# Patient Record
Sex: MALE | Race: BLACK OR AFRICAN AMERICAN | NOT HISPANIC OR LATINO | URBAN - METROPOLITAN AREA
[De-identification: names, ages, dates, MRNs, and addresses within clinical notes are randomized per-mention and may not be internally consistent; named-entity substitution may affect disease eponyms.]

---

## 2018-08-28 ENCOUNTER — OFFICE VISIT CONVERTED (OUTPATIENT)
Dept: SURGERY | Facility: CLINIC | Age: 68
End: 2018-08-28
Attending: NURSE PRACTITIONER

## 2019-07-24 ENCOUNTER — HOSPITAL ENCOUNTER (OUTPATIENT)
Dept: SURGERY | Facility: HOSPITAL | Age: 69
Setting detail: HOSPITAL OUTPATIENT SURGERY
Discharge: HOME | End: 2019-07-24
Attending: SURGERY

## 2021-05-16 VITALS — BODY MASS INDEX: 22.08 KG/M2 | WEIGHT: 163 LBS | HEIGHT: 72 IN | RESPIRATION RATE: 12 BRPM

## 2023-08-02 ENCOUNTER — OFFICE VISIT (OUTPATIENT)
Dept: FAMILY MEDICINE CLINIC | Facility: CLINIC | Age: 73
End: 2023-08-02
Payer: MEDICARE

## 2023-08-02 VITALS
WEIGHT: 162.4 LBS | TEMPERATURE: 98.7 F | RESPIRATION RATE: 16 BRPM | DIASTOLIC BLOOD PRESSURE: 78 MMHG | BODY MASS INDEX: 22 KG/M2 | OXYGEN SATURATION: 95 % | HEIGHT: 72 IN | HEART RATE: 75 BPM | SYSTOLIC BLOOD PRESSURE: 138 MMHG

## 2023-08-02 DIAGNOSIS — B07.0 PLANTAR WART OF LEFT FOOT: ICD-10-CM

## 2023-08-02 DIAGNOSIS — M79.672 LEFT FOOT PAIN: ICD-10-CM

## 2023-08-02 DIAGNOSIS — Z76.89 ENCOUNTER TO ESTABLISH CARE: Primary | ICD-10-CM

## 2023-08-25 ENCOUNTER — OFFICE VISIT (OUTPATIENT)
Dept: FAMILY MEDICINE CLINIC | Facility: CLINIC | Age: 73
End: 2023-08-25
Payer: MEDICARE

## 2023-08-25 VITALS
TEMPERATURE: 97.6 F | HEART RATE: 80 BPM | SYSTOLIC BLOOD PRESSURE: 148 MMHG | WEIGHT: 160 LBS | BODY MASS INDEX: 21.67 KG/M2 | OXYGEN SATURATION: 96 % | RESPIRATION RATE: 18 BRPM | HEIGHT: 72 IN | DIASTOLIC BLOOD PRESSURE: 82 MMHG

## 2023-08-25 DIAGNOSIS — Z87.891 PERSONAL HISTORY OF TOBACCO USE, PRESENTING HAZARDS TO HEALTH: ICD-10-CM

## 2023-08-25 DIAGNOSIS — B07.0 PLANTAR WART OF LEFT FOOT: Primary | ICD-10-CM

## 2023-08-25 DIAGNOSIS — Z11.59 NEED FOR HEPATITIS C SCREENING TEST: ICD-10-CM

## 2023-08-25 DIAGNOSIS — Z12.11 ENCOUNTER FOR SCREENING FOR MALIGNANT NEOPLASM OF COLON: ICD-10-CM

## 2023-08-25 DIAGNOSIS — Z13.29 SCREENING FOR THYROID DISORDER: ICD-10-CM

## 2023-08-25 DIAGNOSIS — Z12.2 ENCOUNTER FOR SCREENING FOR LUNG CANCER: ICD-10-CM

## 2023-08-25 DIAGNOSIS — Z13.220 SCREENING, LIPID: ICD-10-CM

## 2023-08-25 DIAGNOSIS — Z87.891 PERSONAL HISTORY OF NICOTINE DEPENDENCE: ICD-10-CM

## 2023-08-25 PROCEDURE — 1160F RVW MEDS BY RX/DR IN RCRD: CPT

## 2023-08-25 PROCEDURE — 99214 OFFICE O/P EST MOD 30 MIN: CPT

## 2023-08-25 PROCEDURE — 1159F MED LIST DOCD IN RCRD: CPT

## 2023-08-25 NOTE — PROGRESS NOTES
"Chief Complaint   Patient presents with    Foot Pain     Wart on left foot.       Subjective          José Miguel Story presents to [facility]    History of Present Illness  José Miguel is here to be seen for foot pain on the left foot. He has a wart on that side that has been cut out twice by Dr. Simmons and froze by Dr Zieglre once. He would like to proceed with seeing Podiatry for this. He is in a lot of pain and is unable to ambulate properly.     Past History:  Medical History: has no past medical history on file.   Surgical History: has a past surgical history that includes Wart Removal.   Family History: family history is not on file.   Social History: reports that he has been smoking cigarettes. He started smoking about 45 years ago. He has a 20.25 pack-year smoking history. He has never been exposed to tobacco smoke. He has never used smokeless tobacco. He reports current alcohol use. He reports that he does not use drugs.  Allergies: Patient has no known allergies.  (Not in a hospital admission)       Social History     Socioeconomic History    Marital status:    Tobacco Use    Smoking status: Every Day     Packs/day: 0.45     Years: 45.00     Pack years: 20.25     Types: Cigarettes     Start date: 1978     Passive exposure: Never    Smokeless tobacco: Never   Vaping Use    Vaping Use: Never used   Substance and Sexual Activity    Alcohol use: Yes     Comment: rarely    Drug use: Never    Sexual activity: Defer       Health Maintenance Due   Topic Date Due    COLORECTAL CANCER SCREENING  Never done    LUNG CANCER SCREENING  Never done    HEPATITIS C SCREENING  Never done    AAA SCREEN (ONE-TIME)  Never done       Objective     Vital Signs:   /82 (BP Location: Left arm, Patient Position: Sitting, Cuff Size: Adult)   Pulse 80   Temp 97.6 øF (36.4 øC) (Temporal)   Resp 18   Ht 182.9 cm (72\")   Wt 72.6 kg (160 lb)   SpO2 96%   BMI 21.70 kg/mý       Physical " Exam  Constitutional:       Appearance: Normal appearance.   HENT:      Nose: Nose normal.      Mouth/Throat:      Mouth: Mucous membranes are moist.   Cardiovascular:      Rate and Rhythm: Normal rate and regular rhythm.      Pulses: Normal pulses.      Heart sounds: Normal heart sounds.   Pulmonary:      Effort: Pulmonary effort is normal.      Breath sounds: Normal breath sounds.   Skin:     General: Skin is warm and dry.      Findings: Lesion present.   Neurological:      General: No focal deficit present.      Mental Status: He is alert and oriented to person, place, and time.   Psychiatric:         Mood and Affect: Mood normal.         Behavior: Behavior normal.        Review of Systems   All other systems reviewed and are negative.     Result Review :                 Assessment and Plan    Diagnoses and all orders for this visit:    1. Plantar wart of left foot (Primary)  Comments:  In a lot of pain and causing him ambulatory problems  Orders:  -     Ambulatory Referral to Podiatry    2. Encounter for screening for malignant neoplasm of colon  -     Ambulatory Referral For Screening Colonoscopy    3. Encounter for screening for lung cancer  -      CT Chest Low Dose Cancer Screening WO; Future    4. Need for hepatitis C screening test  -     Hepatitis C Antibody; Future    5. Personal history of tobacco use, presenting hazards to health  -      aaa screen limited; Future    6. Personal history of nicotine dependence  -      CT Chest Low Dose Cancer Screening WO; Future  -     CBC w AUTO Differential; Future  -     Comprehensive metabolic panel; Future    7. Screening, lipid  -     Lipid panel; Future    8. Screening for thyroid disorder  -     TSH+Free T4; Future          Pt thought to be clinically stable at this time.    Follow Up   Return if symptoms worsen or fail to improve.  Patient was given instructions and counseling regarding his condition or for health maintenance advice. Please see specific  information pulled into the AVS if appropriate.

## 2023-08-30 ENCOUNTER — OFFICE VISIT (OUTPATIENT)
Dept: PODIATRY | Facility: CLINIC | Age: 73
End: 2023-08-30
Payer: MEDICARE

## 2023-08-30 VITALS
OXYGEN SATURATION: 95 % | WEIGHT: 154 LBS | HEIGHT: 72 IN | SYSTOLIC BLOOD PRESSURE: 155 MMHG | DIASTOLIC BLOOD PRESSURE: 99 MMHG | TEMPERATURE: 98 F | HEART RATE: 86 BPM | BODY MASS INDEX: 20.86 KG/M2

## 2023-08-30 DIAGNOSIS — M79.672 FOOT PAIN, LEFT: ICD-10-CM

## 2023-08-30 DIAGNOSIS — L89.891 PRESSURE INJURY OF LEFT FOOT, STAGE 1: Primary | ICD-10-CM

## 2023-08-30 RX ORDER — AMMONIUM LACTATE 12 G/100G
LOTION TOPICAL 2 TIMES DAILY
Qty: 225 G | Refills: 11 | Status: SHIPPED | OUTPATIENT
Start: 2023-08-30

## 2023-08-30 NOTE — LETTER
August 30, 2023       No Recipients    Patient: José Miguel Story   YOB: 1950   Date of Visit: 8/30/2023       Dear JASMYN Reagan:    Thank you for referring José Miguel Story to me for evaluation. Below are the relevant portions of my assessment and plan of care.    Encounter Diagnosis and Orders:  Diagnoses and all orders for this visit:    1. Pressure injury of left foot, stage 1 (Primary)  -     ammonium lactate (AmLactin) 12 % lotion; Apply  topically to the appropriate area as directed 2 (Two) Times a Day.  Dispense: 225 g; Refill: 11    2. Foot pain, left        If you have questions, please do not hesitate to call me. I look forward to following José Miguel along with you.         Sincerely,        Jose Cabral DPM        CC:   No Recipients

## 2023-08-30 NOTE — PROGRESS NOTES
[facility]    Today's Date: 08/30/23    Patient Name: José Miguel Story  MRN: [id number]  CSN: [id number]  PCP: Akira Ziegler DO  Referring Provider: Lia Stevens APRN    SUBJECTIVE     Chief Complaint   Patient presents with    Left Foot - Establish Care, Plantar Warts     Present for several years states States Dr Simmons cut these out x 2 but it has grown back      HPI: José Miguel Story, a 73 y.o.male, comes presents to clinic with chief complaint of:    New, Established, New Problem: New    Location:  hyperkeratotic lesion(s) on her left fifth metatarsal head    Duration: Several years    Onset:  gradual    Nature:  sore    Aggravating factors:  Patient reports lesion(s) is painful with shoegear and ambulation.      Previous Treatment:   Patient states he has had this excised by his primary care provider, Dr. Simmons, on 2 different occasions in the past which he states grew back.    Past Medical History:   Diagnosis Date    Hypertension     Plantar wart of left foot      Past Surgical History:   Procedure Laterality Date    WART REMOVAL       History reviewed. No pertinent family history.  Social History     Socioeconomic History    Marital status:    Tobacco Use    Smoking status: Every Day     Packs/day: 0.45     Years: 45.00     Pack years: 20.25     Types: Cigarettes     Start date: 1978     Passive exposure: Never    Smokeless tobacco: Never   Vaping Use    Vaping Use: Never used   Substance and Sexual Activity    Alcohol use: Yes     Comment: rarely    Drug use: Never    Sexual activity: Defer     No Known Allergies  Current Outpatient Medications   Medication Sig Dispense Refill    ammonium lactate (AmLactin) 12 % lotion Apply  topically to the appropriate area as directed 2 (Two) Times a Day. 225 g 11     No current facility-administered medications for this visit.     Review of Systems   Constitutional: Negative.    Skin:         Painful hyperkeratotic lesion on the  plantar left fifth metatarsal head.   All other systems reviewed and are negative.    OBJECTIVE     Vitals:    08/30/23 1259   BP: 155/99   Pulse:    Temp:    SpO2:        Body mass index is 20.89 kg/mý.    No results found for: GLUCOSE, CALCIUM, NA, K, CO2, CL, BUN, CREATININE, EGFRIFAFRI, EGFRIFNONA, BCR, ANIONGAP    Patient seen in no apparent distress.      PHYSICAL EXAM:     Foot/Ankle Exam    GENERAL  Appearance:  appears stated age, elderly and healthy  Orientation:  AAOx3  Affect:  appropriate  Gait:  unimpaired  Assistance:  independent  Right shoe gear: casual shoe  Left shoe gear: casual shoe    VASCULAR     Right Foot Vascularity   Normal vascular exam    Dorsalis pedis:  2+  Posterior tibial:  2+  Skin temperature:  warm  Edema grading:  None  CFT:  < 3 seconds  Pedal hair growth:  Present  Varicosities:  none     Left Foot Vascularity   Normal vascular exam    Dorsalis pedis:  2+  Posterior tibial:  2+  Skin temperature:  warm  Edema grading:  None  CFT:  < 3 seconds  Pedal hair growth:  Present  Varicosities:  none     NEUROLOGIC     Right Foot Neurologic   Normal sensation    Light touch sensation: normal  Vibratory sensation: normal  Hot/Cold sensation: normal  Protective Sensation using [place] Monofilament:   Sites intact: 10  Sites tested: 10     Left Foot Neurologic   Normal sensation    Light touch sensation: normal  Vibratory sensation: normal  Hot/Cold sensation:  normal  Protective Sensation using [place] Monofilament:   Sites intact: 10  Sites tested: 10    MUSCLE STRENGTH     Right Foot Muscle Strength   Foot dorsiflexion:  4  Foot plantar flexion:  4  Foot inversion:  4  Foot eversion:  4     Left Foot Muscle Strength   Foot dorsiflexion:  4  Foot plantar flexion:  4  Foot inversion:  4  Foot eversion:  4    RANGE OF MOTION     Right Foot Range of Motion   Foot and ankle ROM within normal limits       Left Foot Range of Motion   Foot and ankle ROM within normal limits       DERMATOLOGIC      Right Foot Dermatologic   Skin  Right foot skin is intact.      Left Foot Dermatologic   Skin  Positive for ulcer.      Left foot additional comments: Stage 1 ulceration on the plantar third metatarsal head area of the foot.  Hyperkeratotic tissue with subepidermal hemosiderin deposition is present.  No surrounding, edema, erythema, lymphangitis, fluctuance, nor signs of infection.  No drainage present.  12 mm x 10 mm x 3 mm.  This area was debrided via excisional partial thickness debridement with a 10 scalpel blade.  Post debridement measurements were 9 mm x 9 mm x 1 mm in depth.    ASSESSMENT/PLAN     Diagnoses and all orders for this visit:    1. Pressure injury of left foot, stage 1 (Primary)  -     ammonium lactate (AmLactin) 12 % lotion; Apply  topically to the appropriate area as directed 2 (Two) Times a Day.  Dispense: 225 g; Refill: 11    2. Foot pain, left        Comprehensive lower extremity examination and evaluation was performed.    Discussed findings and treatment plan including risks, benefits, and treatment options with patient in detail. Patient agreed with treatment plan.    An After Visit Summary was printed and given to the patient at discharge, including (if requested) any available informative/educational handouts regarding diagnosis, treatment, or medications. All questions were answered to patient/family satisfaction. Should symptoms fail to improve or worsen they agree to call or return to clinic or to go to the Emergency Department. Discussed the importance of following up with any needed screening tests/labs/specialist appointments and any requested follow-up recommended by me today. Importance of maintaining follow-up discussed and patient accepts that missed appointments can delay diagnosis and potentially lead to worsening of conditions.    Return in about 5 weeks (around 10/4/2023) for Ulcer Follow-Up., or sooner if acute issues arise.    This document has been  electronically signed by Jose Cabral DPM on August 30, 2023 13:24 EDT

## 2023-09-14 ENCOUNTER — HOSPITAL ENCOUNTER (OUTPATIENT)
Dept: ULTRASOUND IMAGING | Facility: HOSPITAL | Age: 73
Discharge: HOME | End: 2023-09-14
Payer: MEDICARE

## 2023-09-14 DIAGNOSIS — Z87.891 PERSONAL HISTORY OF TOBACCO USE, PRESENTING HAZARDS TO HEALTH: ICD-10-CM

## 2023-09-14 PROCEDURE — 76706 US ABDL AORTA SCREEN AAA: CPT

## 2023-09-21 ENCOUNTER — HOSPITAL ENCOUNTER (OUTPATIENT)
Dept: CT IMAGING | Facility: HOSPITAL | Age: 73
Discharge: HOME | End: 2023-09-21
Payer: MEDICARE

## 2023-09-21 DIAGNOSIS — Z12.2 ENCOUNTER FOR SCREENING FOR LUNG CANCER: ICD-10-CM

## 2023-09-21 DIAGNOSIS — Z87.891 PERSONAL HISTORY OF NICOTINE DEPENDENCE: ICD-10-CM

## 2023-09-21 PROCEDURE — 71271 CT THORAX LUNG CANCER SCR C-: CPT

## 2023-10-04 ENCOUNTER — OFFICE VISIT (OUTPATIENT)
Dept: PODIATRY | Facility: CLINIC | Age: 73
End: 2023-10-04
Payer: MEDICARE

## 2023-10-04 VITALS
HEIGHT: 72 IN | HEART RATE: 86 BPM | OXYGEN SATURATION: 99 % | BODY MASS INDEX: 20.86 KG/M2 | TEMPERATURE: 97.5 F | WEIGHT: 154 LBS | DIASTOLIC BLOOD PRESSURE: 90 MMHG | SYSTOLIC BLOOD PRESSURE: 163 MMHG

## 2023-10-04 DIAGNOSIS — L89.891 PRESSURE INJURY OF LEFT FOOT, STAGE 1: Primary | ICD-10-CM

## 2023-10-04 DIAGNOSIS — M79.672 FOOT PAIN, LEFT: ICD-10-CM

## 2023-10-04 NOTE — PROGRESS NOTES
[facility] - [facility]AT[facility]    Today's Date: 10/04/23    Patient Name: José Miguel Story  MRN: [id number]  CSN: [id number]  PCP: Akira Ziegler DO  Referring Provider: No ref. provider found    SUBJECTIVE     Chief Complaint   Patient presents with    Left Foot - Follow-up, Foot Ulcer     HPI: José Miguel Story, a 73 y.o.male, comes presents to clinic with chief complaint of:    New, Established, New Problem: est    Location:  hyperkeratotic lesion(s) on her left fifth metatarsal head    Duration: Several years    Onset:  gradual    Nature:  sore    Tx:  debridement    Aggravating factors:  Patient reports lesion(s) is painful with shoegear and ambulation.      Previous Treatment:   Patient states he has had this excised by his primary care provider, Dr. Simmons, on 2 different occasions in the past which he states grew back.    Past Medical History:   Diagnosis Date    Foot ulcer     Hypertension     Plantar wart of left foot      Past Surgical History:   Procedure Laterality Date    WART REMOVAL       History reviewed. No pertinent family history.  Social History     Socioeconomic History    Marital status:    Tobacco Use    Smoking status: Every Day     Packs/day: 0.45     Years: 45.00     Pack years: 20.25     Types: Cigarettes     Start date: 1978     Passive exposure: Never    Smokeless tobacco: Never   Vaping Use    Vaping Use: Never used   Substance and Sexual Activity    Alcohol use: Yes     Comment: rarely    Drug use: Never    Sexual activity: Defer     No Known Allergies  Current Outpatient Medications   Medication Sig Dispense Refill    ammonium lactate (AmLactin) 12 % lotion Apply  topically to the appropriate area as directed 2 (Two) Times a Day. 225 g 11     No current facility-administered medications for this visit.     Review of Systems   Constitutional: Negative.    Skin:         Painful hyperkeratotic lesion on the plantar left fifth metatarsal head.   All other systems reviewed and are  negative.    OBJECTIVE     Vitals:    10/04/23 1451   BP: 163/90   Pulse:    Temp:    SpO2:        Body mass index is 20.89 kg/m².    No results found for: GLUCOSE, CALCIUM, NA, K, CO2, CL, BUN, CREATININE, EGFRIFAFRI, EGFRIFNONA, BCR, ANIONGAP    Patient seen in no apparent distress.      PHYSICAL EXAM:     Foot/Ankle Exam    GENERAL  Appearance:  appears stated age, elderly and healthy  Orientation:  AAOx3  Affect:  appropriate  Gait:  unimpaired  Assistance:  independent  Right shoe gear: casual shoe  Left shoe gear: casual shoe    VASCULAR     Right Foot Vascularity   Normal vascular exam    Dorsalis pedis:  2+  Posterior tibial:  2+  Skin temperature:  warm  Edema grading:  None  CFT:  < 3 seconds  Pedal hair growth:  Present  Varicosities:  none     Left Foot Vascularity   Normal vascular exam    Dorsalis pedis:  2+  Posterior tibial:  2+  Skin temperature:  warm  Edema grading:  None  CFT:  < 3 seconds  Pedal hair growth:  Present  Varicosities:  none     NEUROLOGIC     Right Foot Neurologic   Normal sensation    Light touch sensation: normal  Vibratory sensation: normal  Hot/Cold sensation: normal  Protective Sensation using [place]-Johnathan Monofilament:   Sites intact: 10  Sites tested: 10     Left Foot Neurologic   Normal sensation    Light touch sensation: normal  Vibratory sensation: normal  Hot/Cold sensation:  normal  Protective Sensation using [place]athan Monofilament:   Sites intact: 10  Sites tested: 10    MUSCLE STRENGTH     Right Foot Muscle Strength   Foot dorsiflexion:  4  Foot plantar flexion:  4  Foot inversion:  4  Foot eversion:  4     Left Foot Muscle Strength   Foot dorsiflexion:  4  Foot plantar flexion:  4  Foot inversion:  4  Foot eversion:  4    RANGE OF MOTION     Right Foot Range of Motion   Foot and ankle ROM within normal limits       Left Foot Range of Motion   Foot and ankle ROM within normal limits      DERMATOLOGIC      Right Foot Dermatologic   Skin  Right foot skin is  intact.      Left Foot Dermatologic   Skin  Positive for ulcer.      Left foot additional comments: Stage 1 ulceration on the plantar third metatarsal head area of the foot.  Hyperkeratotic tissue with subepidermal hemosiderin deposition is present.  No surrounding, edema, erythema, lymphangitis, fluctuance, nor signs of infection.  No drainage present.  11 mm x 10 mm x 3 mm.  This area was debrided via excisional partial thickness debridement with a 15 scalpel blade.  Post debridement measurements were 9 mm x 8 mm x 1 mm in depth.    ASSESSMENT/PLAN     Diagnoses and all orders for this visit:    1. Pressure injury of left foot, stage 1 (Primary)    2. Foot pain, left        Comprehensive lower extremity examination and evaluation was performed.    Discussed findings and treatment plan including risks, benefits, and treatment options with patient in detail. Patient agreed with treatment plan.    An After Visit Summary was printed and given to the patient at discharge, including (if requested) any available informative/educational handouts regarding diagnosis, treatment, or medications. All questions were answered to patient/family satisfaction. Should symptoms fail to improve or worsen they agree to call or return to clinic or to go to the Emergency Department. Discussed the importance of following up with any needed screening tests/labs/specialist appointments and any requested follow-up recommended by me today. Importance of maintaining follow-up discussed and patient accepts that missed appointments can delay diagnosis and potentially lead to worsening of conditions.    Return in about 5 weeks (around 11/8/2023) for Ulcer Follow-Up., or sooner if acute issues arise.    This document has been electronically signed by Jose Cabral DPM on October 4, 2023 15:14 EDT

## 2024-05-23 ENCOUNTER — TELEPHONE (OUTPATIENT)
Dept: FAMILY MEDICINE CLINIC | Facility: CLINIC | Age: 74
End: 2024-05-23
Payer: MEDICARE

## 2024-05-23 NOTE — TELEPHONE ENCOUNTER
Caller: José Miguel Story    Relationship to patient: Self    Best call back number: [phone]    Patient is needing: PATIENT CALLED IN AND SAID HE RECEIVED A LETTER ABOUT TESTS BEING CANCELED AND HE WOULD LIKE A CALL BACK WITH GUIDANCE AS TO WHAT TESTS HE IS NEEDING TO HAVE. PATIENT SAID IT IS OKAY TO LEAVE MESSAGE ON PHONE.

## 2024-05-24 NOTE — TELEPHONE ENCOUNTER
Spoke with patient, he states he is a patient of Dr. Simmons and will continue to see him.  Changed PCP in computer, patient just had labs done with Dr. Simmons last week and doesn't need anything from us moving forward.

## 2024-10-25 ENCOUNTER — TRANSCRIBE ORDERS (OUTPATIENT)
Dept: ADMINISTRATIVE | Facility: HOSPITAL | Age: 74
End: 2024-10-25
Payer: MEDICARE

## 2024-10-25 DIAGNOSIS — Z72.0 TOBACCO ABUSE: Primary | ICD-10-CM

## 2025-01-24 ENCOUNTER — HOSPITAL ENCOUNTER (OUTPATIENT)
Dept: GENERAL RADIOLOGY | Facility: HOSPITAL | Age: 75
Discharge: HOME | End: 2025-01-24
Payer: MEDICARE

## 2025-01-24 ENCOUNTER — TRANSCRIBE ORDERS (OUTPATIENT)
Dept: GENERAL RADIOLOGY | Facility: HOSPITAL | Age: 75
End: 2025-01-24
Payer: MEDICARE

## 2025-01-24 DIAGNOSIS — M79.674 PAIN OF TOE OF RIGHT FOOT: Primary | ICD-10-CM

## 2025-01-24 DIAGNOSIS — M79.674 PAIN OF TOE OF RIGHT FOOT: ICD-10-CM

## 2025-01-24 PROCEDURE — 73660 X-RAY EXAM OF TOE(S): CPT

## 2025-02-02 ENCOUNTER — APPOINTMENT (OUTPATIENT)
Dept: GENERAL RADIOLOGY | Facility: HOSPITAL | Age: 75
End: 2025-02-02
Payer: MEDICARE

## 2025-02-02 ENCOUNTER — HOSPITAL ENCOUNTER (EMERGENCY)
Facility: HOSPITAL | Age: 75
Discharge: HOME | End: 2025-02-02
Attending: EMERGENCY MEDICINE | Admitting: EMERGENCY MEDICINE
Payer: MEDICARE

## 2025-02-02 VITALS
TEMPERATURE: 97.5 F | HEIGHT: 71 IN | SYSTOLIC BLOOD PRESSURE: 153 MMHG | DIASTOLIC BLOOD PRESSURE: 97 MMHG | OXYGEN SATURATION: 95 % | WEIGHT: 151.01 LBS | RESPIRATION RATE: 15 BRPM | HEART RATE: 70 BPM | BODY MASS INDEX: 21.14 KG/M2

## 2025-02-02 DIAGNOSIS — M54.12 CERVICAL RADICULOPATHY: Primary | ICD-10-CM

## 2025-02-02 PROCEDURE — 96375 TX/PRO/DX INJ NEW DRUG ADDON: CPT

## 2025-02-02 PROCEDURE — 25010000002 DEXAMETHASONE PER 1 MG: Performed by: EMERGENCY MEDICINE

## 2025-02-02 PROCEDURE — 25010000002 ORPHENADRINE CITRATE PER 60 MG: Performed by: EMERGENCY MEDICINE

## 2025-02-02 PROCEDURE — 25010000002 KETOROLAC TROMETHAMINE PER 15 MG: Performed by: EMERGENCY MEDICINE

## 2025-02-02 PROCEDURE — 96374 THER/PROPH/DIAG INJ IV PUSH: CPT

## 2025-02-02 PROCEDURE — 72050 X-RAY EXAM NECK SPINE 4/5VWS: CPT

## 2025-02-02 PROCEDURE — 99283 EMERGENCY DEPT VISIT LOW MDM: CPT

## 2025-02-02 RX ORDER — TRAMADOL HYDROCHLORIDE 50 MG/1
50 TABLET ORAL EVERY 6 HOURS PRN
Qty: 15 TABLET | Refills: 0 | Status: SHIPPED | OUTPATIENT
Start: 2025-02-02

## 2025-02-02 RX ORDER — CYCLOBENZAPRINE HCL 10 MG
10 TABLET ORAL 3 TIMES DAILY
Qty: 20 TABLET | Refills: 0 | Status: SHIPPED | OUTPATIENT
Start: 2025-02-02

## 2025-02-02 RX ORDER — OXYCODONE AND ACETAMINOPHEN 5; 325 MG/1; MG/1
1 TABLET ORAL ONCE
Status: COMPLETED | OUTPATIENT
Start: 2025-02-02 | End: 2025-02-02

## 2025-02-02 RX ORDER — ORPHENADRINE CITRATE 30 MG/ML
60 INJECTION INTRAMUSCULAR; INTRAVENOUS ONCE
Status: COMPLETED | OUTPATIENT
Start: 2025-02-02 | End: 2025-02-02

## 2025-02-02 RX ORDER — DEXAMETHASONE SODIUM PHOSPHATE 4 MG/ML
10 INJECTION, SOLUTION INTRA-ARTICULAR; INTRALESIONAL; INTRAMUSCULAR; INTRAVENOUS; SOFT TISSUE ONCE
Status: COMPLETED | OUTPATIENT
Start: 2025-02-02 | End: 2025-02-02

## 2025-02-02 RX ORDER — KETOROLAC TROMETHAMINE 10 MG/1
10 TABLET, FILM COATED ORAL EVERY 6 HOURS PRN
Qty: 15 TABLET | Refills: 0 | Status: SHIPPED | OUTPATIENT
Start: 2025-02-02

## 2025-02-02 RX ORDER — KETOROLAC TROMETHAMINE 15 MG/ML
15 INJECTION, SOLUTION INTRAMUSCULAR; INTRAVENOUS ONCE
Status: COMPLETED | OUTPATIENT
Start: 2025-02-02 | End: 2025-02-02

## 2025-02-02 RX ADMIN — KETOROLAC TROMETHAMINE 15 MG: 15 INJECTION, SOLUTION INTRAMUSCULAR; INTRAVENOUS at 10:03

## 2025-02-02 RX ADMIN — DEXAMETHASONE SODIUM PHOSPHATE 10 MG: 4 INJECTION, SOLUTION INTRAMUSCULAR; INTRAVENOUS at 10:04

## 2025-02-02 RX ADMIN — ORPHENADRINE CITRATE 60 MG: 60 INJECTION INTRAMUSCULAR; INTRAVENOUS at 10:08

## 2025-02-02 RX ADMIN — OXYCODONE HYDROCHLORIDE AND ACETAMINOPHEN 1 TABLET: 5; 325 TABLET ORAL at 10:11

## 2025-02-02 NOTE — ED PROVIDER NOTES
Time: 9:44 AM EST  Date of encounter:  2/2/2025  Independent Historian/Clinical History and Information was obtained by:   Patient    History is limited by: N/A    Chief Complaint: Neck pain      History of Present Illness:  Patient is a 74 y.o. year old male who presents to the emergency department for evaluation of neck pain with radiation to the left shoulder.  Patient denies trauma.  Patient has no fever or chills.  Patient has no chest pain or shortness of breath.  Patient has no cough or hemoptysis.  Patient denies nausea, vomiting, and diarrhea.  Patient states that he woke up with this pain several days ago.  And has not gotten better.  He states that it is to the point where he has difficulty turning his head to the side.      Patient Care Team  Primary Care Provider: Derrick Simmons MD    Past Medical History:     No Known Allergies  Past Medical History:   Diagnosis Date    Foot ulcer     Hypertension     Plantar wart of left foot      Past Surgical History:   Procedure Laterality Date    WART REMOVAL       History reviewed. No pertinent family history.    Home Medications:  Prior to Admission medications    Medication Sig Start Date End Date Taking? Authorizing Provider   ammonium lactate (AmLactin) 12 % lotion Apply  topically to the appropriate area as directed 2 (Two) Times a Day. 8/30/23   Jose Cabral DPM        Social History:   Social History     Tobacco Use    Smoking status: Every Day     Current packs/day: 0.45     Average packs/day: 0.5 packs/day for 47.1 years (21.2 ttl pk-yrs)     Types: Cigarettes     Start date: 1978     Passive exposure: Never    Smokeless tobacco: Never   Vaping Use    Vaping status: Never Used   Substance Use Topics    Alcohol use: Yes     Comment: rarely    Drug use: Never         Review of Systems:  Review of Systems   Constitutional:  Negative for chills and fever.   HENT:  Negative for congestion, rhinorrhea and sore throat.    Eyes:  Negative for pain  "and visual disturbance.   Respiratory:  Negative for apnea, cough, chest tightness and shortness of breath.    Cardiovascular:  Negative for chest pain and palpitations.   Gastrointestinal:  Negative for abdominal pain, diarrhea, nausea and vomiting.   Genitourinary:  Negative for difficulty urinating and dysuria.   Musculoskeletal:  Positive for neck pain. Negative for joint swelling and myalgias.   Skin:  Negative for color change.   Neurological:  Negative for seizures and headaches.   Psychiatric/Behavioral: Negative.     All other systems reviewed and are negative.       Physical Exam:  /95 (BP Location: Right arm, Patient Position: Lying)   Pulse 85   Temp 98.1 °F (36.7 °C) (Oral)   Resp 18   Ht 180.3 cm (71\")   Wt 68.5 kg (151 lb 0.2 oz)   SpO2 98%   BMI 21.06 kg/m²     Physical Exam  Vitals and nursing note reviewed.   Constitutional:       General: He is not in acute distress.     Appearance: Normal appearance. He is not toxic-appearing.   HENT:      Head: Normocephalic and atraumatic.      Jaw: There is normal jaw occlusion.   Eyes:      General: Lids are normal.      Extraocular Movements: Extraocular movements intact.      Conjunctiva/sclera: Conjunctivae normal.      Pupils: Pupils are equal, round, and reactive to light.   Cardiovascular:      Rate and Rhythm: Normal rate and regular rhythm.      Pulses: Normal pulses.      Heart sounds: Normal heart sounds.   Pulmonary:      Effort: Pulmonary effort is normal. No respiratory distress.      Breath sounds: Normal breath sounds. No wheezing or rhonchi.   Abdominal:      General: Abdomen is flat.      Palpations: Abdomen is soft.      Tenderness: There is no abdominal tenderness. There is no guarding or rebound.   Musculoskeletal:         General: Normal range of motion.      Cervical back: Normal range of motion and neck supple.      Right lower leg: No edema.      Left lower leg: No edema.      Comments: (+) Left trapezius tenderness "   Skin:     General: Skin is warm and dry.   Neurological:      Mental Status: He is alert and oriented to person, place, and time. Mental status is at baseline.   Psychiatric:         Mood and Affect: Mood normal.                    Medical Decision Making:      Comorbidities that affect care:    Hypertension    External Notes reviewed:    None      The following orders were placed and all results were independently analyzed by me:  Orders Placed This Encounter   Procedures    XR Spine Cervical Complete 4 or 5 View       Medications Given in the Emergency Department:  Medications   ketorolac (TORADOL) injection 15 mg (15 mg Intravenous Given 2/2/25 1003)   orphenadrine (NORFLEX) injection 60 mg (60 mg Intravenous Given 2/2/25 1008)   dexAMETHasone (DECADRON) injection 10 mg (10 mg Intravenous Given 2/2/25 1004)   oxyCODONE-acetaminophen (PERCOCET) 5-325 MG per tablet 1 tablet (1 tablet Oral Given 2/2/25 1011)        ED Course:         Labs:    Lab Results (last 24 hours)       ** No results found for the last 24 hours. **             Imaging:    XR Spine Cervical Complete 4 or 5 View    Result Date: 2/2/2025  XR SPINE CERVICAL COMPLETE 4 OR 5 VW Date of Exam: 2/2/2025 10:18 AM EST Indication: Neck pain Comparison: None available. Findings: There is slight anterior offset of C4 on C5. There also slight anterior offset of C5 on C6. There is spurring about the disc space at C6-7. On the oblique views the intervertebral foramina on the right seem patent. On the left there is narrowing of the intervertebral foramina at C4-5. There is bilateral facet arthropathy. There is some vascular calcification within the neck. The relationship of the lateral pillars of C1 with respect to C2 does not appear unusual.     Impression: Evidence for underlying degenerative change. Electronically Signed: Kevin Dior MD  2/2/2025 10:39 AM EST  Workstation ID: [id number]       Differential Diagnosis and Discussion:    Neck Pain: The patient  presents with neck pain. My differential diagnosis includes but is not limited to acute spinal epidural abscess, acute spinal epidural bleed, meningitis, musculoskeletal neck pain, spinal fracture, and osteoarthritis.     PROCEDURES:    X-ray were performed in the emergency department and all X-ray impressions were independently interpreted by me.    No orders to display       Procedures    MDM     X-ray is consistent with degenerative changes.  The patient's symptoms are consistent with a strain vs. sprain.     A muscle strain, also known as a pulled muscle, is an injury that occurs when a muscle is overstretched or torn, often as a result of fatigue, overuse, or improper use. This can result in pain, swelling, and a limited range of motion.    A sprain, on the other hand, is an injury to a ligament, which is the tissue that connects bones to each other. Sprains often occur in joints like the ankle or wrist when they are twisted or impacted in a way that stretches or tears the ligaments. Symptoms of a sprain can include pain, swelling, bruising, and a decreased ability to move the joint.    The patient was counseled to use rest, ice, and elevation and follow-up with their PCP or an orthopedic surgeon.                Patient Care Considerations:    MRI: I considered ordering an MRI however patient has a normal neurological exam and is at baseline.      Consultants/Shared Management Plan:    None    Social Determinants of Health:    Patient is independent, reliable, and has access to care.       Disposition and Care Coordination:    Discharged: The patient is suitable and stable for discharge with no need for consideration of admission.    I have explained the patient´s condition, diagnoses and treatment plan based on the information available to me at this time. I have answered questions and addressed any concerns. The patient has a good  understanding of the patient´s diagnosis, condition, and treatment plan as can  be expected at this point. The vital signs have been stable. The patient´s condition is stable and appropriate for discharge from the emergency department.      The patient will pursue further outpatient evaluation with the primary care physician or other designated or consulting physician as outlined in the discharge instructions. They are agreeable to this plan of care and follow-up instructions have been explained in detail. The patient has received these instructions in written format and has expressed an understanding of the discharge instructions. The patient is aware that any significant change in condition or worsening of symptoms should prompt an immediate return to this or the closest emergency department or call to 1.  I have explained discharge medications and the need for follow up with the patient/caretakers. This was also printed in the discharge instructions. Patient was discharged with the following medications and follow up:      Medication List        New Prescriptions      cyclobenzaprine 10 MG tablet  Commonly known as: FLEXERIL  Take 1 tablet by mouth 3 (Three) Times a Day.     ketorolac 10 MG tablet  Commonly known as: TORADOL  Take 1 tablet by mouth Every 6 (Six) Hours As Needed for Moderate Pain.     traMADol 50 MG tablet  Commonly known as: ULTRAM  Take 1 tablet by mouth Every 6 (Six) Hours As Needed for Moderate Pain.               Where to Get Your Medications        These medications were sent to [place], [place] - [address] [place] [address] - [phone]  - [phone] [address] [address]      Phone: [phone]   cyclobenzaprine 10 MG tablet  ketorolac 10 MG tablet  traMADol 50 MG tablet      Derrick Simmons MD  [address] [id number] [phone]    In 2 days         Final diagnoses:   Cervical radiculopathy        ED Disposition       ED Disposition   Discharge    Condition   Stable    Comment   --               This  medical record created using voice recognition software.             Татьяна Middleton MD  02/02/25 [id number]